# Patient Record
Sex: FEMALE | Race: BLACK OR AFRICAN AMERICAN | NOT HISPANIC OR LATINO | URBAN - METROPOLITAN AREA
[De-identification: names, ages, dates, MRNs, and addresses within clinical notes are randomized per-mention and may not be internally consistent; named-entity substitution may affect disease eponyms.]

---

## 2024-10-30 ENCOUNTER — OFFICE VISIT (OUTPATIENT)
Dept: URBAN - METROPOLITAN AREA CLINIC 109 | Facility: CLINIC | Age: 39
End: 2024-10-30
Payer: COMMERCIAL

## 2024-10-30 ENCOUNTER — LAB OUTSIDE AN ENCOUNTER (OUTPATIENT)
Dept: URBAN - METROPOLITAN AREA CLINIC 109 | Facility: CLINIC | Age: 39
End: 2024-10-30

## 2024-10-30 VITALS
DIASTOLIC BLOOD PRESSURE: 87 MMHG | TEMPERATURE: 97.2 F | HEART RATE: 64 BPM | HEIGHT: 60 IN | BODY MASS INDEX: 30.31 KG/M2 | WEIGHT: 154.4 LBS | SYSTOLIC BLOOD PRESSURE: 129 MMHG

## 2024-10-30 DIAGNOSIS — K52.9 CHRONIC DIARRHEA: ICD-10-CM

## 2024-10-30 DIAGNOSIS — R10.13 EPIGASTRIC PAIN: ICD-10-CM

## 2024-10-30 DIAGNOSIS — R10.84 GENERALIZED ABDOMINAL PAIN: ICD-10-CM

## 2024-10-30 PROCEDURE — 99204 OFFICE O/P NEW MOD 45 MIN: CPT

## 2024-10-30 RX ORDER — TOPIRAMATE 200 MG/1
1 TABLET TABLET, FILM COATED ORAL ONCE A DAY
Status: ACTIVE | COMMUNITY

## 2024-10-30 RX ORDER — HYOSCYAMINE SULFATE 0.12 MG/ML
1 ML AS NEEDED SOLUTION ORAL
Status: ACTIVE | COMMUNITY

## 2024-10-30 RX ORDER — HYOSCYAMINE SULFATE 0.12 MG/1
1 TABLET UNDER THE TONGUE AND ALLOW TO DISSOLVE TABLET SUBLINGUAL
Qty: 30 | Refills: 1 | OUTPATIENT
Start: 2024-10-30 | End: 2024-12-28

## 2024-10-30 RX ORDER — CHOLESTYRAMINE 4 G/9G
1 PACKET MIXED WITH WATER OR NON-CARBONATED DRINK POWDER, FOR SUSPENSION ORAL
Qty: 60 | Refills: 1 | OUTPATIENT
Start: 2024-10-30

## 2024-10-30 RX ORDER — BUTALBITAL, ASPIRIN, AND CAFFEINE 50; 325; 40 MG/1; MG/1; MG/1
1 CAPSULE AS NEEDED CAPSULE ORAL
Status: ACTIVE | COMMUNITY

## 2024-10-30 RX ORDER — PANTOPRAZOLE SODIUM 40 MG/1
1 TABLET TABLET, DELAYED RELEASE ORAL
Qty: 30 | Refills: 1 | OUTPATIENT
Start: 2024-10-30

## 2024-10-30 RX ORDER — SOLIFENACIN SUCCINATE 5 MG/1
1 TABLET TABLET, FILM COATED ORAL ONCE A DAY
Status: ACTIVE | COMMUNITY

## 2024-10-30 RX ORDER — FOLIC ACID 1 MG/1
1 TABLET TABLET ORAL ONCE A DAY
Status: ACTIVE | COMMUNITY

## 2024-10-30 RX ORDER — SUCRALFATE 1 G/1
1 TABLET ON AN EMPTY STOMACH TABLET ORAL
Qty: 90 | Refills: 1 | OUTPATIENT
Start: 2024-10-30 | End: 2024-12-28

## 2024-10-30 NOTE — PHYSICAL EXAM GASTROINTESTINAL
Abdomen: soft and nondistended, no visible masses, generalized abdominal tenderness to palpation worst in epigastric region, no guarding or rigidity, no rebound tenderness, no palpable masses, normal bowel sounds. Liver and Spleen: no hepatosplenomegaly, liver nontender. Rectal: Deferred

## 2024-10-30 NOTE — PHYSICAL EXAM CONSTITUTIONAL:
Well developed, well nourished, normal communication ability. Pt initially in no acute distress but after abdomen PE exam, bent over in tears in moderate acute distress, ambulating with some difficulty bent forward.

## 2024-10-30 NOTE — HPI-TODAY'S VISIT:
10/30/24: Pt is a 38 yo female here for f/u appt, reports same sxs as below wanting an extra opinion since she's not getting better.  present in room. She has been unable to take many of the meds previously prescribed d/t cost including Rixamin and most recently TCA. Reports took hyoscamine 1x QHS w/o help, tried to take TID but caused too much GI upset.  Reports significant nausea, unable to eat from pain. Pain worst right after eats and drinks anything.  No emesis. Still mostly 5-8 watery stools/day but at times formed BMs inbtwn.  No GI bleeding sxs or fever/chills. Of note heavy NSAIDs w/ migraine med multiple xs/wk.  No recent imaging or prior EGD.  Denies heart, lung, or kidney disease. No blood thinners outside aspirin.  - - - - - - - - - - - - - - - - 7/10/24 (Dr. Sparks): 38 yo female presents for follow up of abdominal pain, diarrhea. She had a recent colonoscopy with biopsies that was normal. Unable to get xifaxan due to cost. No response to dicyclomine, caused abdominal pain. 39-year-old female here for follow-up of chronic diarrhea, abdominal pain.  At last visit, she had labs including TSH, CBC, celiac antibodies all of which were normal.  Stool studies were normal.  She was given IBgard, dicyclomine which did not help.  Denies rectal bleeding, weight loss.

## 2024-10-31 ENCOUNTER — OFFICE VISIT (OUTPATIENT)
Dept: URBAN - METROPOLITAN AREA SURGERY CENTER 23 | Facility: SURGERY CENTER | Age: 39
End: 2024-10-31

## 2024-10-31 ENCOUNTER — TELEPHONE ENCOUNTER (OUTPATIENT)
Dept: URBAN - METROPOLITAN AREA CLINIC 109 | Facility: CLINIC | Age: 39
End: 2024-10-31

## 2024-10-31 RX ORDER — HYOSCYAMINE SULFATE 0.12 MG/ML
1 ML AS NEEDED SOLUTION ORAL
Status: ACTIVE | COMMUNITY

## 2024-10-31 RX ORDER — TOPIRAMATE 200 MG/1
1 TABLET TABLET, FILM COATED ORAL ONCE A DAY
Status: ACTIVE | COMMUNITY

## 2024-10-31 RX ORDER — BUTALBITAL, ASPIRIN, AND CAFFEINE 50; 325; 40 MG/1; MG/1; MG/1
1 CAPSULE AS NEEDED CAPSULE ORAL
Status: ACTIVE | COMMUNITY

## 2024-10-31 RX ORDER — CHOLESTYRAMINE 4 G/9G
1 PACKET MIXED WITH WATER OR NON-CARBONATED DRINK POWDER, FOR SUSPENSION ORAL
Qty: 60 | Refills: 1 | Status: ACTIVE | COMMUNITY
Start: 2024-10-30

## 2024-10-31 RX ORDER — SOLIFENACIN SUCCINATE 5 MG/1
1 TABLET TABLET, FILM COATED ORAL ONCE A DAY
Status: ACTIVE | COMMUNITY

## 2024-10-31 RX ORDER — HYOSCYAMINE SULFATE 0.12 MG/1
1 TABLET UNDER THE TONGUE AND ALLOW TO DISSOLVE TABLET SUBLINGUAL
Qty: 30 | Refills: 1 | Status: ACTIVE | COMMUNITY
Start: 2024-10-30 | End: 2024-12-28

## 2024-10-31 RX ORDER — PANTOPRAZOLE SODIUM 40 MG/1
1 TABLET TABLET, DELAYED RELEASE ORAL
Qty: 30 | Refills: 1 | Status: ACTIVE | COMMUNITY
Start: 2024-10-30

## 2024-10-31 RX ORDER — SUCRALFATE 1 G/1
1 TABLET ON AN EMPTY STOMACH TABLET ORAL
Qty: 90 | Refills: 1 | Status: ACTIVE | COMMUNITY
Start: 2024-10-30 | End: 2024-12-28

## 2024-10-31 RX ORDER — FOLIC ACID 1 MG/1
1 TABLET TABLET ORAL ONCE A DAY
Status: ACTIVE | COMMUNITY

## 2024-11-05 ENCOUNTER — TELEPHONE ENCOUNTER (OUTPATIENT)
Dept: URBAN - METROPOLITAN AREA CLINIC 109 | Facility: CLINIC | Age: 39
End: 2024-11-05

## 2024-11-14 ENCOUNTER — TELEPHONE ENCOUNTER (OUTPATIENT)
Dept: URBAN - METROPOLITAN AREA CLINIC 109 | Facility: CLINIC | Age: 39
End: 2024-11-14

## 2024-11-18 ENCOUNTER — LAB OUTSIDE AN ENCOUNTER (OUTPATIENT)
Dept: URBAN - METROPOLITAN AREA CLINIC 109 | Facility: CLINIC | Age: 39
End: 2024-11-18

## 2024-11-18 ENCOUNTER — OFFICE VISIT (OUTPATIENT)
Dept: URBAN - METROPOLITAN AREA CLINIC 109 | Facility: CLINIC | Age: 39
End: 2024-11-18
Payer: COMMERCIAL

## 2024-11-18 ENCOUNTER — OFFICE VISIT (OUTPATIENT)
Dept: URBAN - METROPOLITAN AREA CLINIC 109 | Facility: CLINIC | Age: 39
End: 2024-11-18

## 2024-11-18 VITALS
WEIGHT: 158.2 LBS | HEIGHT: 60 IN | DIASTOLIC BLOOD PRESSURE: 85 MMHG | HEART RATE: 73 BPM | BODY MASS INDEX: 31.06 KG/M2 | TEMPERATURE: 96.8 F | SYSTOLIC BLOOD PRESSURE: 131 MMHG

## 2024-11-18 DIAGNOSIS — R11.0 NAUSEA: ICD-10-CM

## 2024-11-18 DIAGNOSIS — R10.84 GENERALIZED ABDOMINAL PAIN: ICD-10-CM

## 2024-11-18 DIAGNOSIS — R10.13 EPIGASTRIC PAIN: ICD-10-CM

## 2024-11-18 DIAGNOSIS — K52.9 CHRONIC DIARRHEA: ICD-10-CM

## 2024-11-18 DIAGNOSIS — K58.2 IRRITABLE BOWEL SYNDROME WITH BOTH CONSTIPATION AND DIARRHEA: ICD-10-CM

## 2024-11-18 PROCEDURE — 99214 OFFICE O/P EST MOD 30 MIN: CPT | Performed by: INTERNAL MEDICINE

## 2024-11-18 RX ORDER — SOLIFENACIN SUCCINATE 5 MG/1
1 TABLET TABLET, FILM COATED ORAL ONCE A DAY
Status: ACTIVE | COMMUNITY

## 2024-11-18 RX ORDER — TOPIRAMATE 200 MG/1
1 TABLET TABLET, FILM COATED ORAL ONCE A DAY
Status: ACTIVE | COMMUNITY

## 2024-11-18 RX ORDER — SUCRALFATE 1 G/1
1 TABLET ON AN EMPTY STOMACH TABLET ORAL
Qty: 90 | Refills: 1 | Status: ACTIVE | COMMUNITY
Start: 2024-10-30 | End: 2024-12-28

## 2024-11-18 RX ORDER — CHOLESTYRAMINE 4 G/9G
1 PACKET MIXED WITH WATER OR NON-CARBONATED DRINK POWDER, FOR SUSPENSION ORAL
Qty: 60 | Refills: 1 | Status: ON HOLD | COMMUNITY
Start: 2024-10-30

## 2024-11-18 RX ORDER — FOLIC ACID 1 MG/1
1 TABLET TABLET ORAL ONCE A DAY
Status: ACTIVE | COMMUNITY

## 2024-11-18 RX ORDER — PANTOPRAZOLE SODIUM 40 MG/1
1 TABLET TABLET, DELAYED RELEASE ORAL
Qty: 30 | Refills: 2 | OUTPATIENT

## 2024-11-18 RX ORDER — BUTALBITAL, ASPIRIN, AND CAFFEINE 50; 325; 40 MG/1; MG/1; MG/1
1 CAPSULE AS NEEDED CAPSULE ORAL
Status: ON HOLD | COMMUNITY

## 2024-11-18 RX ORDER — HYOSCYAMINE SULFATE 0.12 MG/1
1 TABLET UNDER THE TONGUE AND ALLOW TO DISSOLVE TABLET SUBLINGUAL
Qty: 30 | Refills: 1 | Status: ACTIVE | COMMUNITY
Start: 2024-10-30 | End: 2024-12-28

## 2024-11-18 RX ORDER — PANTOPRAZOLE SODIUM 40 MG/1
1 TABLET TABLET, DELAYED RELEASE ORAL
Qty: 30 | Refills: 1 | Status: ACTIVE | COMMUNITY
Start: 2024-10-30

## 2024-11-18 NOTE — PHYSICAL EXAM GASTROINTESTINAL
Abdomen: soft and nondistended, no visible masses, generalized tenderness to palpation worst in epigastric region, no guarding or rigidity, no rebound tenderness, no palpable masses, normal bowel sounds. Liver and Spleen: no hepatosplenomegaly, liver nontender. Rectal: Deferred

## 2024-11-18 NOTE — HPI-TODAY'S VISIT:
11/19/24-   Pt is a 37 yo female who returns with persistent refractory sxs. Nausea constant w/o emesis. Since last OV workup as below including ER visit on 11/12.  Reports no change in sxs since PPI 40 mg daily w/ correct timing and sucralfate 1 gm TID.  Most help in sxs on zofran and reglan from ER. Also given opioid x pain & didn't start carafate bc already taking. Denies nsaids. Dark stools on iron, not black since off peptobismol.   BM habits same as below and c/o fecal & urinary urgency and incontinence. Failed levsin and didn't try metamucil. Advised to hold off on cholestryamine after last visit d/t constipation on CT. No fever/chills. Weight stable.  EGD (Dr. Garsia) 10/31: Normal except erosive gastritis. BX- foveolar hyperplasia; no HP, IM, dysplasia.   CT A/P with PO and IV contrast 10/30: No acute findings. Prominent stool throughout colon, no obstruction. Incidental uterine fibroids and left kidney stone and simple cyst. Otherwise normal. No abdominal wall fluid or hernia.   CT A/P IV contrast only 11/12: Fluid-filled loops of small bowel which can be seen in enteritis, colonic diverticulosis, punctate non-obstructing left renal calculi, hypodense mass in left adnexa (pelvic US showed 1.9 cm left ovarian hemorrhagic cyst almost certainly benign and multiple uterine fibroids)  LABS Grossly unremarkable including CBC, CMP, CRP,  lipase, serum pregnancy.  Serum glucose at 111.  - - - - - - - - - - - - - - - - - - - - - - - - - 10/30/24 (LUCIANA Flanagan)-   Pt is a 40 yo female here for f/u appt, reports same sxs as below wanting an extra opinion since she's not getting better.  present in room.  She has been unable to take many of the meds previously prescribed d/t cost including Rixamin and most recently TCA. Reports took hyoscamine 1x QHS w/o help, tried to take TID but caused too much GI upset.  Reports significant nausea, unable to eat from pain. Pain worst right after eats and drinks anything.  No emesis. Still mostly 5-8 watery stools/day but at times formed BMs inbtwn.  No GI bleeding sxs or fever/chills. Of note heavy NSAIDs w/ migraine med multiple xs/wk.  No recent imaging or prior EGD.  Denies heart, lung, or kidney disease. No blood thinners outside aspirin.  - - - - - - - - - - - - - - - - 7/10/24 (Dr. Sparks): 40 yo female presents for follow up of abdominal pain, diarrhea. She had a recent colonoscopy with biopsies that was normal. Unable to get xifaxan due to cost. No response to dicyclomine, caused abdominal pain. 39-year-old female here for follow-up of chronic diarrhea, abdominal pain.  At last visit, she had labs including TSH, CBC, celiac antibodies all of which were normal.  Stool studies were normal.  She was given IBgard, dicyclomine which did not help.  Denies rectal bleeding, weight loss. room air

## 2024-11-20 ENCOUNTER — TELEPHONE ENCOUNTER (OUTPATIENT)
Dept: URBAN - METROPOLITAN AREA CLINIC 109 | Facility: CLINIC | Age: 39
End: 2024-11-20

## 2024-11-20 PROBLEM — 10743008: Status: ACTIVE | Noted: 2024-11-20

## 2024-11-25 ENCOUNTER — OFFICE VISIT (OUTPATIENT)
Dept: URBAN - METROPOLITAN AREA CLINIC 109 | Facility: CLINIC | Age: 39
End: 2024-11-25

## 2024-11-27 ENCOUNTER — TELEPHONE ENCOUNTER (OUTPATIENT)
Dept: URBAN - METROPOLITAN AREA CLINIC 109 | Facility: CLINIC | Age: 39
End: 2024-11-27

## 2024-11-27 ENCOUNTER — OFFICE VISIT (OUTPATIENT)
Dept: URBAN - METROPOLITAN AREA CLINIC 109 | Facility: CLINIC | Age: 39
End: 2024-11-27

## 2024-12-17 ENCOUNTER — OFFICE VISIT (OUTPATIENT)
Dept: URBAN - METROPOLITAN AREA CLINIC 109 | Facility: CLINIC | Age: 39
End: 2024-12-17
Payer: COMMERCIAL

## 2024-12-17 VITALS
TEMPERATURE: 97.7 F | HEIGHT: 60 IN | BODY MASS INDEX: 31.96 KG/M2 | HEART RATE: 81 BPM | DIASTOLIC BLOOD PRESSURE: 83 MMHG | SYSTOLIC BLOOD PRESSURE: 121 MMHG | WEIGHT: 162.8 LBS

## 2024-12-17 DIAGNOSIS — K52.9 CHRONIC DIARRHEA: ICD-10-CM

## 2024-12-17 DIAGNOSIS — R10.13 EPIGASTRIC PAIN: ICD-10-CM

## 2024-12-17 DIAGNOSIS — R10.84 GENERALIZED ABDOMINAL PAIN: ICD-10-CM

## 2024-12-17 PROCEDURE — 99214 OFFICE O/P EST MOD 30 MIN: CPT | Performed by: INTERNAL MEDICINE

## 2024-12-17 RX ORDER — SOLIFENACIN SUCCINATE 5 MG/1
1 TABLET TABLET, FILM COATED ORAL ONCE A DAY
Status: ACTIVE | COMMUNITY

## 2024-12-17 RX ORDER — SUCRALFATE 1 G/1
1 TABLET ON AN EMPTY STOMACH TABLET ORAL
Qty: 90 | Refills: 1 | Status: ACTIVE | COMMUNITY
Start: 2024-10-30 | End: 2024-12-28

## 2024-12-17 RX ORDER — CHOLESTYRAMINE 4 G/9G
1 PACKET MIXED WITH WATER OR NON-CARBONATED DRINK POWDER, FOR SUSPENSION ORAL
Qty: 60 | Refills: 1 | Status: ON HOLD | COMMUNITY
Start: 2024-10-30

## 2024-12-17 RX ORDER — HYOSCYAMINE SULFATE 0.12 MG/ML
1 ML AS NEEDED SOLUTION ORAL
Status: ACTIVE | COMMUNITY

## 2024-12-17 RX ORDER — DICYCLOMINE HYDROCHLORIDE 20 MG/1
1 TABLET TABLET ORAL
Qty: 90 | Refills: 5 | OUTPATIENT
Start: 2024-12-17 | End: 2025-06-15

## 2024-12-17 RX ORDER — HYOSCYAMINE SULFATE 0.12 MG/1
1 TABLET UNDER THE TONGUE AND ALLOW TO DISSOLVE TABLET SUBLINGUAL
Qty: 30 | Refills: 1 | Status: ACTIVE | COMMUNITY
Start: 2024-10-30 | End: 2024-12-28

## 2024-12-17 RX ORDER — FOLIC ACID 1 MG/1
1 TABLET TABLET ORAL ONCE A DAY
Status: ACTIVE | COMMUNITY

## 2024-12-17 RX ORDER — BUTALBITAL, ASPIRIN, AND CAFFEINE 50; 325; 40 MG/1; MG/1; MG/1
1 CAPSULE AS NEEDED CAPSULE ORAL
Status: ON HOLD | COMMUNITY

## 2024-12-17 RX ORDER — PANTOPRAZOLE SODIUM 40 MG/1
1 TABLET TABLET, DELAYED RELEASE ORAL
Qty: 30 | Refills: 2 | Status: ACTIVE | COMMUNITY

## 2024-12-17 RX ORDER — TOPIRAMATE 200 MG/1
1 TABLET TABLET, FILM COATED ORAL ONCE A DAY
Status: ACTIVE | COMMUNITY

## 2024-12-17 NOTE — HPI-TODAY'S VISIT:
pt presents for GI evaluation. pt h/o IBS now with recurrent abdominal pain with fecal urgency and loose stools. pt notes abdominal pain nd cramping with symptoms. No rectal bleeding or other LGI symptoms. Notes some gerd and dyspepsia. No other complaints.

## 2024-12-20 ENCOUNTER — TELEPHONE ENCOUNTER (OUTPATIENT)
Dept: URBAN - METROPOLITAN AREA CLINIC 109 | Facility: CLINIC | Age: 39
End: 2024-12-20

## 2024-12-30 ENCOUNTER — OFFICE VISIT (OUTPATIENT)
Dept: URBAN - METROPOLITAN AREA CLINIC 109 | Facility: CLINIC | Age: 39
End: 2024-12-30

## 2025-04-22 ENCOUNTER — OFFICE VISIT (OUTPATIENT)
Dept: URBAN - METROPOLITAN AREA CLINIC 109 | Facility: CLINIC | Age: 40
End: 2025-04-22
Payer: COMMERCIAL

## 2025-04-22 DIAGNOSIS — R10.84 GENERALIZED ABDOMINAL PAIN: ICD-10-CM

## 2025-04-22 DIAGNOSIS — R11.0 NAUSEA: ICD-10-CM

## 2025-04-22 DIAGNOSIS — K52.9 CHRONIC DIARRHEA: ICD-10-CM

## 2025-04-22 PROCEDURE — 99214 OFFICE O/P EST MOD 30 MIN: CPT | Performed by: INTERNAL MEDICINE

## 2025-04-22 RX ORDER — HYOSCYAMINE SULFATE 0.12 MG/ML
1 ML AS NEEDED SOLUTION ORAL
Status: ACTIVE | COMMUNITY

## 2025-04-22 RX ORDER — FOLIC ACID 1 MG/1
1 TABLET TABLET ORAL ONCE A DAY
Status: ACTIVE | COMMUNITY

## 2025-04-22 RX ORDER — DICYCLOMINE HYDROCHLORIDE 20 MG/1
1 TABLET TABLET ORAL
Qty: 90 | Refills: 5 | Status: ACTIVE | COMMUNITY
Start: 2024-12-17 | End: 2025-06-15

## 2025-04-22 RX ORDER — BUTALBITAL, ASPIRIN, AND CAFFEINE 325; 50; 40 MG/1; MG/1; MG/1
1 CAPSULE AS NEEDED CAPSULE ORAL
Status: ON HOLD | COMMUNITY

## 2025-04-22 RX ORDER — CHOLESTYRAMINE 4 G/9G
1 PACKET MIXED WITH WATER OR NON-CARBONATED DRINK POWDER, FOR SUSPENSION ORAL
Qty: 60 | Refills: 1 | Status: ON HOLD | COMMUNITY
Start: 2024-10-30

## 2025-04-22 RX ORDER — PANTOPRAZOLE SODIUM 40 MG/1
1 TABLET TABLET, DELAYED RELEASE ORAL
Qty: 30 | Refills: 2 | Status: ACTIVE | COMMUNITY

## 2025-04-22 RX ORDER — TOPIRAMATE 200 MG/1
1 TABLET TABLET, FILM COATED ORAL ONCE A DAY
Status: ACTIVE | COMMUNITY

## 2025-04-22 RX ORDER — ONDANSETRON HYDROCHLORIDE 4 MG/1
1 TABLET TABLET, FILM COATED ORAL
Qty: 90 | Refills: 5 | OUTPATIENT
Start: 2025-04-22

## 2025-04-22 RX ORDER — DICYCLOMINE HYDROCHLORIDE 20 MG/1
1 TABLET TABLET ORAL
Qty: 90 | Refills: 5 | OUTPATIENT
Start: 2025-04-22

## 2025-04-22 RX ORDER — SOLIFENACIN SUCCINATE 5 MG/1
1 TABLET TABLET, FILM COATED ORAL ONCE A DAY
Status: ACTIVE | COMMUNITY

## 2025-04-22 NOTE — PHYSICAL EXAM CARDIOVASCULAR:
Please notify Randell Camacho that her labs were reviewed and were essentially unremarkable. Specifically rheumatoid factor, lupus testing, and allergy testing were all negative. no edema,  no murmurs,  regular rate and rhythm

## 2025-04-22 NOTE — HPI-TODAY'S VISIT:
pt presents for GI follow up. pt seen for functional symptoms of gas, bloating and abdominal cramping now improved with Dicyclomine prn. pt notes symptoms of nausea and would like med. No weight loss or anemia. Notes loose stools also improved. No other complaints. pt does notes symptoms associated with anxiety/stress